# Patient Record
Sex: MALE | Race: WHITE | Employment: UNEMPLOYED | URBAN - METROPOLITAN AREA
[De-identification: names, ages, dates, MRNs, and addresses within clinical notes are randomized per-mention and may not be internally consistent; named-entity substitution may affect disease eponyms.]

---
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
## 2023-05-17 ENCOUNTER — OFFICE VISIT (OUTPATIENT)
Dept: PRIMARY CARE | Facility: CLINIC | Age: 18
End: 2023-05-17
Payer: COMMERCIAL

## 2023-05-17 VITALS
WEIGHT: 153 LBS | DIASTOLIC BLOOD PRESSURE: 62 MMHG | OXYGEN SATURATION: 98 % | HEART RATE: 82 BPM | RESPIRATION RATE: 14 BRPM | HEIGHT: 72 IN | BODY MASS INDEX: 20.72 KG/M2 | SYSTOLIC BLOOD PRESSURE: 118 MMHG

## 2023-05-17 DIAGNOSIS — R20.9 COLD EXTREMITIES: ICD-10-CM

## 2023-05-17 DIAGNOSIS — I73.00 RAYNAUD'S PHENOMENON WITHOUT GANGRENE: ICD-10-CM

## 2023-05-17 DIAGNOSIS — Z00.00 ANNUAL PHYSICAL EXAM: Primary | ICD-10-CM

## 2023-05-17 PROBLEM — L70.9 ACNE: Status: ACTIVE | Noted: 2023-05-17

## 2023-05-17 PROCEDURE — 99395 PREV VISIT EST AGE 18-39: CPT | Performed by: FAMILY MEDICINE

## 2023-05-17 ASSESSMENT — ENCOUNTER SYMPTOMS
GASTROINTESTINAL NEGATIVE: 1
RESPIRATORY NEGATIVE: 1
CARDIOVASCULAR NEGATIVE: 1
ALLERGIC/IMMUNOLOGIC NEGATIVE: 1
HEMATOLOGIC/LYMPHATIC NEGATIVE: 1
EYES NEGATIVE: 1
PSYCHIATRIC NEGATIVE: 1
MUSCULOSKELETAL NEGATIVE: 1
NEUROLOGICAL NEGATIVE: 1
CONSTITUTIONAL NEGATIVE: 1
ENDOCRINE NEGATIVE: 1

## 2023-05-17 NOTE — PROGRESS NOTES
Subjective   Patient ID: Jose Trent is a 17 y.o. male who presents for a physical    HPI Pt states he is here for a yearly physical.    Pt would like to discuss that his hands and feet stay cold and he would like to discuss this today.    Review of Systems   Constitutional: Negative.    HENT: Negative.     Eyes: Negative.    Respiratory: Negative.     Cardiovascular: Negative.    Gastrointestinal: Negative.    Endocrine: Negative.    Genitourinary: Negative.    Musculoskeletal: Negative.    Skin: Negative.    Allergic/Immunologic: Negative.    Neurological: Negative.    Hematological: Negative.    Psychiatric/Behavioral: Negative.         Objective   /62 (BP Location: Left arm, Patient Position: Sitting, BP Cuff Size: Adult)   Pulse 82   Resp 14   Ht 1.829 m (6')   Wt 69.4 kg (153 lb)   SpO2 98%   BMI 20.75 kg/m²     Physical Exam CONSTITUTIONAL- NAD, Pt is A & O x3, Vital signs reviewed per chart.  General Appearance- normal , good hygiene,talks easily  EYES-PERRLA conjunctiva and lids- normal  EARS/NOSE-TM's normal, head normocephalic and atraumatic, naso pharynx-no nasal discharge, no trismus,  oropharynx- normal without exudate  NECK- supple, FROM, without mass  thyroid- NT, normal size, no nodule noted  LYMPH- WNL  CV- RRR without murmur, gallop, or other abnormality.  PULM- CTA bilaterally  Respiratory effort- normal respiratory effort , no retractions or nasal flaring  ABDOMEN- normoactive BS's , soft , NT, no hepatosplenomegaly palpated, or masses. No pulsatile masses noted  extremities no edema,NT  SKIN- no abnormal skin lesions on inspection or palpation  neuro- no focal deficits  PSYCH- pleasant, normal judgement and insight      Assessment/Plan   Problem List Items Addressed This Visit       Raynaud phenomenon    Annual physical exam - Primary    Relevant Orders    CBC    Comprehensive Metabolic Panel    Lipid Panel    Thyroid Stimulating Hormone    Thyroxine, Free    Thyroxine, Total      Other Visit Diagnoses       Cold extremities        Relevant Orders    CBC    Comprehensive Metabolic Panel    Lipid Panel    Thyroid Stimulating Hormone    Thyroxine, Free    Thyroxine, Total             Scribe Attestation  By signing my name below, I, Hung Miranda DO  , Scribe   attest that this documentation has been prepared under the direction and in the presence of Hung Miranda DO.

## 2023-06-14 ENCOUNTER — OFFICE VISIT (OUTPATIENT)
Dept: PRIMARY CARE | Facility: CLINIC | Age: 18
End: 2023-06-14
Payer: COMMERCIAL

## 2023-06-14 VITALS
RESPIRATION RATE: 14 BRPM | HEIGHT: 72 IN | DIASTOLIC BLOOD PRESSURE: 62 MMHG | OXYGEN SATURATION: 97 % | HEART RATE: 72 BPM | BODY MASS INDEX: 20.45 KG/M2 | WEIGHT: 151 LBS | SYSTOLIC BLOOD PRESSURE: 124 MMHG

## 2023-06-14 DIAGNOSIS — F41.0 PANIC ATTACKS: ICD-10-CM

## 2023-06-14 DIAGNOSIS — G89.29 CHRONIC PAIN OF LEFT KNEE: ICD-10-CM

## 2023-06-14 DIAGNOSIS — M25.562 CHRONIC PAIN OF LEFT KNEE: ICD-10-CM

## 2023-06-14 DIAGNOSIS — F19.10 SUBSTANCE ABUSE (MULTI): ICD-10-CM

## 2023-06-14 PROBLEM — Z00.00 ANNUAL PHYSICAL EXAM: Status: RESOLVED | Noted: 2023-05-17 | Resolved: 2023-06-14

## 2023-06-14 PROBLEM — L70.9 ACNE: Status: RESOLVED | Noted: 2023-05-17 | Resolved: 2023-06-14

## 2023-06-14 PROCEDURE — 80307 DRUG TEST PRSMV CHEM ANLYZR: CPT

## 2023-06-14 PROCEDURE — 1036F TOBACCO NON-USER: CPT | Performed by: FAMILY MEDICINE

## 2023-06-14 PROCEDURE — 99214 OFFICE O/P EST MOD 30 MIN: CPT | Performed by: FAMILY MEDICINE

## 2023-06-14 RX ORDER — MELOXICAM 15 MG/1
15 TABLET ORAL DAILY
Qty: 30 TABLET | Refills: 3 | Status: SHIPPED | OUTPATIENT
Start: 2023-06-14 | End: 2024-06-13

## 2023-06-14 ASSESSMENT — ENCOUNTER SYMPTOMS
CHEST TIGHTNESS: 1
DIZZINESS: 1
NERVOUS/ANXIOUS: 1

## 2023-06-14 NOTE — PROGRESS NOTES
Subjective   Patient ID: Jose Trent is a 17 y.o. male who presents for anxiety and knee pain     Anxiety  Symptoms include dizziness and nervous/anxious behavior.       Knee Pain   The incident occurred more than 1 week ago. [place]. The injury mechanism is unknown. The pain is present in the left knee. The pain is at a severity of 4/10. The pain is moderate.      Pt states he uses Marijuana off and on and states the last 3 times he did it, he had panic attacks.    Review of Systems   Respiratory:  Positive for chest tightness.    Neurological:  Positive for dizziness.   Psychiatric/Behavioral:  The patient is nervous/anxious.        Objective   /62 (BP Location: Left arm, Patient Position: Sitting, BP Cuff Size: Small adult)   Pulse 72   Resp 14   Ht 1.829 m (6')   Wt 68.5 kg (151 lb)   SpO2 97%   BMI 20.48 kg/m²     Physical Exam CONSTITUTIONAL- NAD, Pt is A & O x3, Vital signs reviewed per chart.  General Appearance- normal , good hygiene,talks easily  EYES-PERRLA conjunctiva and lids- normal  EARS/NOSE-TM's normal, head normocephalic and atraumatic, naso pharynx-no nasal discharge, no trismus,  oropharynx- normal without exudate  NECK- supple, FROM, without mass  thyroid- NT, normal size, no nodule noted  LYMPH- WNL  CV- RRR without murmur, gallop, or other abnormality.  PULM- CTA bilaterally  Respiratory effort- normal respiratory effort , no retractions or nasal flaring  ABDOMEN- normoactive BS's , soft , NT, no hepatosplenomegaly palpated, or masses. No pulsatile masses noted  extremities no edema,NT  SKIN- no abnormal skin lesions on inspection or palpation  neuro- no focal deficits  PSYCH- pleasant, normal judgement and insight     Assessment/Plan   Problem List Items Addressed This Visit    None  Visit Diagnoses       Chronic pain of left knee        Relevant Medications    meloxicam (Mobic) 15 mg tablet    Other Relevant Orders    XR knee left 3 views    Panic  attacks        Relevant Orders    Drug Screen, Urine With Reflex to Confirmation    Substance abuse (CMS/[facility])        Relevant Orders    Drug Screen, Urine With Reflex to Confirmation             Scribe Attestation  By signing my name below, I, Hung Miranda DO  , Scribe   attest that this documentation has been prepared under the direction and in the presence of Hung Miranda DO.

## 2023-06-15 LAB
AMPHETAMINE (PRESENCE) IN URINE BY SCREEN METHOD: NORMAL
BARBITURATES PRESENCE IN URINE BY SCREEN METHOD: NORMAL
BENZODIAZEPINE (PRESENCE) IN URINE BY SCREEN METHOD: NORMAL
CANNABINOIDS IN URINE BY SCREEN METHOD: NORMAL
COCAINE (PRESENCE) IN URINE BY SCREEN METHOD: NORMAL
DRUG SCREEN COMMENT URINE: NORMAL
FENTANYL URINE: NORMAL
METHADONE (PRESENCE) IN URINE BY SCREEN METHOD: NORMAL
OPIATES (PRESENCE) IN URINE BY SCREEN METHOD: NORMAL
OXYCODONE (PRESENCE) IN URINE BY SCREEN METHOD: NORMAL
PHENCYCLIDINE (PRESENCE) IN URINE BY SCREEN METHOD: NORMAL

## 2024-11-27 ENCOUNTER — APPOINTMENT (OUTPATIENT)
Dept: PRIMARY CARE | Facility: CLINIC | Age: 19
End: 2024-11-27
Payer: COMMERCIAL

## 2024-11-27 VITALS
TEMPERATURE: 98 F | RESPIRATION RATE: 16 BRPM | BODY MASS INDEX: 22.35 KG/M2 | WEIGHT: 165 LBS | HEART RATE: 86 BPM | DIASTOLIC BLOOD PRESSURE: 78 MMHG | OXYGEN SATURATION: 98 % | SYSTOLIC BLOOD PRESSURE: 108 MMHG | HEIGHT: 72 IN

## 2024-11-27 DIAGNOSIS — B07.9 VERRUCA: ICD-10-CM

## 2024-11-27 DIAGNOSIS — Z00.00 ROUTINE ADULT HEALTH MAINTENANCE: ICD-10-CM

## 2024-11-27 DIAGNOSIS — Z13.220 NEED FOR LIPID SCREENING: ICD-10-CM

## 2024-11-27 PROCEDURE — 3008F BODY MASS INDEX DOCD: CPT | Performed by: FAMILY MEDICINE

## 2024-11-27 PROCEDURE — 99395 PREV VISIT EST AGE 18-39: CPT | Performed by: FAMILY MEDICINE

## 2024-11-27 PROCEDURE — 1036F TOBACCO NON-USER: CPT | Performed by: FAMILY MEDICINE

## 2024-11-27 RX ORDER — PODOFILOX 5 MG/G
GEL TOPICAL 2 TIMES DAILY
Qty: 3.5 G | Refills: 1 | Status: SHIPPED | OUTPATIENT
Start: 2024-11-27 | End: 2024-12-25

## 2024-11-27 ASSESSMENT — ENCOUNTER SYMPTOMS
ROS SKIN COMMENTS: WART ON TOE
POLYPHAGIA: 0
NERVOUS/ANXIOUS: 0
SORE THROAT: 0
PHOTOPHOBIA: 0
PALPITATIONS: 0
CONFUSION: 0
NECK STIFFNESS: 0
DYSURIA: 0
ABDOMINAL DISTENTION: 0
SINUS PAIN: 0
FATIGUE: 0
SPEECH DIFFICULTY: 0
ABDOMINAL PAIN: 0
SEIZURES: 0
ARTHRALGIAS: 1
SLEEP DISTURBANCE: 0
POLYDIPSIA: 0
ACTIVITY CHANGE: 0
RECTAL PAIN: 0
DIARRHEA: 0
CONSTITUTIONAL NEGATIVE: 1
CHEST TIGHTNESS: 0
CONSTIPATION: 0
AGITATION: 0
EYE PAIN: 0
COUGH: 0
DIZZINESS: 0
SHORTNESS OF BREATH: 0
ADENOPATHY: 0
APPETITE CHANGE: 0
STRIDOR: 0
TROUBLE SWALLOWING: 0
DYSPHORIC MOOD: 0
HEMATURIA: 0
DEPRESSION: 0
SINUS PRESSURE: 0
HEADACHES: 0
MYALGIAS: 0
BLOOD IN STOOL: 0
DECREASED CONCENTRATION: 0
FEVER: 0
COLOR CHANGE: 0
RHINORRHEA: 0
FLANK PAIN: 0

## 2024-11-27 NOTE — PROGRESS NOTES
Subjective   Patient ID: Jose Trent is a 18 y.o. male who presents for Annual Exam.    Patient is being seen today for regular physical and does mention to have a wart on his toe and knee problem.          Review of Systems   Constitutional: Negative.  Negative for activity change, appetite change, fatigue and fever.   HENT:  Negative for congestion, dental problem, ear discharge, ear pain, mouth sores, rhinorrhea, sinus pressure, sinus pain, sore throat, tinnitus and trouble swallowing.    Eyes:  Negative for photophobia, pain and visual disturbance.   Respiratory:  Negative for cough, chest tightness, shortness of breath and stridor.    Cardiovascular:  Negative for chest pain and palpitations.   Gastrointestinal:  Negative for abdominal distention, abdominal pain, blood in stool, constipation, diarrhea and rectal pain.   Endocrine: Negative for cold intolerance, heat intolerance, polydipsia, polyphagia and polyuria.   Genitourinary:  Negative for dysuria, flank pain, hematuria and urgency.   Musculoskeletal:  Positive for arthralgias (knee pain). Negative for gait problem, myalgias and neck stiffness.   Skin:  Negative for color change and rash.        Wart on toe   Allergic/Immunologic: Negative for environmental allergies and food allergies.   Neurological:  Negative for dizziness, seizures, syncope, speech difficulty and headaches.   Hematological:  Negative for adenopathy.   Psychiatric/Behavioral:  Negative for agitation, confusion, decreased concentration, dysphoric mood and sleep disturbance. The patient is not nervous/anxious.        Objective   /78 (BP Location: Left arm, Patient Position: Sitting, BP Cuff Size: Adult)   Pulse 86   Temp 36.7 °C (98 °F) (Temporal)   Resp 16   Ht 1.829 m (6')   Wt 74.8 kg (165 lb)   SpO2 98%   BMI 22.38 kg/m²     Physical Exam  Vitals reviewed.   Constitutional:       General: He is not in acute distress.     Appearance: Normal appearance. He is normal  weight. He is not ill-appearing or diaphoretic.   HENT:      Head: Normocephalic.      Right Ear: Tympanic membrane and external ear normal.      Left Ear: Tympanic membrane and external ear normal.      Nose: Nose normal. No congestion.      Mouth/Throat:      Pharynx: No posterior oropharyngeal erythema.   Eyes:      General:         Right eye: No discharge.         Left eye: No discharge.      Extraocular Movements: Extraocular movements intact.      Conjunctiva/sclera: Conjunctivae normal.      Pupils: Pupils are equal, round, and reactive to light.   Cardiovascular:      Rate and Rhythm: Normal rate and regular rhythm.      Pulses: Normal pulses.      Heart sounds: Normal heart sounds. No murmur heard.  Pulmonary:      Effort: Pulmonary effort is normal. No respiratory distress.      Breath sounds: Normal breath sounds. No wheezing or rales.   Chest:      Chest wall: No tenderness.   Abdominal:      General: Abdomen is flat. Bowel sounds are normal. There is no distension.      Palpations: There is no mass.      Tenderness: There is no abdominal tenderness. There is no guarding.   Musculoskeletal:         General: No tenderness. Normal range of motion.      Cervical back: Normal range of motion and neck supple. No tenderness.      Right lower leg: No edema.      Left lower leg: No edema.   Skin:     General: Skin is dry.      Coloration: Skin is not jaundiced.      Findings: No bruising, erythema or rash.   Neurological:      General: No focal deficit present.      Mental Status: He is alert and oriented to person, place, and time. Mental status is at baseline.      Cranial Nerves: No cranial nerve deficit.      Sensory: No sensory deficit.      Coordination: Coordination normal.      Gait: Gait normal.   Psychiatric:         Mood and Affect: Mood normal.         Thought Content: Thought content normal.         Judgment: Judgment normal.     Patient ID: Jose Trent is a 18 y.o. male.    Destruction of  lesion    Date/Time: 11/27/2024 2:28 PM    Performed by: Hung Miranda DO  Authorized by: Hung Miranda DO    Number of Lesions: 1  Lesion 1:     Body area: lower extremity    Lower extremity location: L second toe    Malignancy: benign lesion      Comments:  Wart removal on second toe of left foot.       Assessment/Plan   Problem List Items Addressed This Visit    None  Visit Diagnoses         Codes    Need for lipid screening     Z13.220    Relevant Orders    Lipid Panel    Routine adult health maintenance     Z00.00    Relevant Orders    Comprehensive Metabolic Panel    CBC and Auto Differential    Thyroid Stimulating Hormone    Verruca     B07.9          Blood work ordered      Scribe Attestation  By signing my name below, INeo RMA , Scribe   attest that this documentation has been prepared under the direction and in the presence of Hung Miranda DO.     All medical record entries made by the Scribe were at my direction and personally dictated by me. I have reviewed the chart and agree that the record accurately reflects my personal performance of the history, physical exam, discussion and plan.